# Patient Record
Sex: FEMALE | Race: OTHER | HISPANIC OR LATINO
[De-identification: names, ages, dates, MRNs, and addresses within clinical notes are randomized per-mention and may not be internally consistent; named-entity substitution may affect disease eponyms.]

---

## 2018-01-10 ENCOUNTER — RESULT REVIEW (OUTPATIENT)
Age: 43
End: 2018-01-10

## 2021-12-03 ENCOUNTER — LABORATORY RESULT (OUTPATIENT)
Age: 46
End: 2021-12-03

## 2021-12-03 ENCOUNTER — APPOINTMENT (OUTPATIENT)
Dept: OBGYN | Facility: CLINIC | Age: 46
End: 2021-12-03
Payer: COMMERCIAL

## 2021-12-03 ENCOUNTER — NON-APPOINTMENT (OUTPATIENT)
Age: 46
End: 2021-12-03

## 2021-12-03 VITALS
DIASTOLIC BLOOD PRESSURE: 60 MMHG | HEIGHT: 59 IN | WEIGHT: 128.2 LBS | SYSTOLIC BLOOD PRESSURE: 112 MMHG | BODY MASS INDEX: 25.84 KG/M2

## 2021-12-03 DIAGNOSIS — Z12.31 ENCOUNTER FOR SCREENING MAMMOGRAM FOR MALIGNANT NEOPLASM OF BREAST: ICD-10-CM

## 2021-12-03 DIAGNOSIS — R10.2 PELVIC AND PERINEAL PAIN: ICD-10-CM

## 2021-12-03 DIAGNOSIS — Z87.19 PERSONAL HISTORY OF OTHER DISEASES OF THE DIGESTIVE SYSTEM: ICD-10-CM

## 2021-12-03 DIAGNOSIS — Z86.2 PERSONAL HISTORY OF DISEASES OF THE BLOOD AND BLOOD-FORMING ORGANS AND CERTAIN DISORDERS INVOLVING THE IMMUNE MECHANISM: ICD-10-CM

## 2021-12-03 DIAGNOSIS — Z78.9 OTHER SPECIFIED HEALTH STATUS: ICD-10-CM

## 2021-12-03 DIAGNOSIS — D64.9 ANEMIA, UNSPECIFIED: ICD-10-CM

## 2021-12-03 DIAGNOSIS — Z01.419 ENCOUNTER FOR GYNECOLOGICAL EXAMINATION (GENERAL) (ROUTINE) W/OUT ABNORMAL FINDINGS: ICD-10-CM

## 2021-12-03 PROBLEM — Z00.00 ENCOUNTER FOR PREVENTIVE HEALTH EXAMINATION: Status: ACTIVE | Noted: 2021-12-03

## 2021-12-03 LAB
BILIRUB UR QL STRIP: NEGATIVE
CLARITY UR: CLEAR
COLLECTION METHOD: NORMAL
GLUCOSE UR-MCNC: NEGATIVE
HCG UR QL: 0.2 EU/DL
HGB UR QL STRIP.AUTO: ABNORMAL
KETONES UR-MCNC: NEGATIVE
LEUKOCYTE ESTERASE UR QL STRIP: ABNORMAL
NITRITE UR QL STRIP: NEGATIVE
PH UR STRIP: 5
PROT UR STRIP-MCNC: NEGATIVE
SP GR UR STRIP: 1.03

## 2021-12-03 PROCEDURE — 81003 URINALYSIS AUTO W/O SCOPE: CPT | Mod: QW

## 2021-12-03 PROCEDURE — 36415 COLL VENOUS BLD VENIPUNCTURE: CPT

## 2021-12-03 PROCEDURE — 99386 PREV VISIT NEW AGE 40-64: CPT

## 2021-12-03 PROCEDURE — 99203 OFFICE O/P NEW LOW 30 MIN: CPT | Mod: 25

## 2021-12-03 NOTE — PROCEDURE
[Cervical Pap Smear] : cervical Pap smear [Liquid Base] : liquid base [Tolerated Well] : the patient tolerated the procedure well [No Complications] : there were no complications [Affirm (Triple Culture)] : Affirm (triple culture)

## 2021-12-04 LAB
BASOPHILS # BLD AUTO: 0.06 K/UL
BASOPHILS NFR BLD AUTO: 0.9 %
EOSINOPHIL # BLD AUTO: 0.08 K/UL
EOSINOPHIL NFR BLD AUTO: 1.2 %
FSH SERPL-MCNC: 15.2 IU/L
HCT VFR BLD CALC: 43.4 %
HGB BLD-MCNC: 13.6 G/DL
HPV HIGH+LOW RISK DNA PNL CVX: DETECTED
IMM GRANULOCYTES NFR BLD AUTO: 0.3 %
LH SERPL-ACNC: 20 IU/L
LYMPHOCYTES # BLD AUTO: 1.72 K/UL
LYMPHOCYTES NFR BLD AUTO: 26.3 %
MAN DIFF?: NORMAL
MCHC RBC-ENTMCNC: 26.6 PG
MCHC RBC-ENTMCNC: 31.3 GM/DL
MCV RBC AUTO: 84.9 FL
MONOCYTES # BLD AUTO: 0.37 K/UL
MONOCYTES NFR BLD AUTO: 5.7 %
NEUTROPHILS # BLD AUTO: 4.29 K/UL
NEUTROPHILS NFR BLD AUTO: 65.6 %
PLATELET # BLD AUTO: 249 K/UL
PROLACTIN SERPL-MCNC: 18.9 NG/ML
RBC # BLD: 5.11 M/UL
RBC # FLD: 19.6 %
TSH SERPL-ACNC: 0.96 UIU/ML
WBC # FLD AUTO: 6.54 K/UL

## 2021-12-06 ENCOUNTER — NON-APPOINTMENT (OUTPATIENT)
Age: 46
End: 2021-12-06

## 2021-12-06 PROBLEM — Z87.19 HISTORY OF GASTROESOPHAGEAL REFLUX (GERD): Status: RESOLVED | Noted: 2021-12-03 | Resolved: 2021-12-06

## 2021-12-06 PROBLEM — Z86.2 HISTORY OF ANEMIA: Status: RESOLVED | Noted: 2021-12-03 | Resolved: 2021-12-06

## 2021-12-06 PROBLEM — Z78.9 NON-SMOKER: Status: ACTIVE | Noted: 2021-12-03

## 2021-12-06 LAB
CANDIDA VAG CYTO: NOT DETECTED
G VAGINALIS+PREV SP MTYP VAG QL MICRO: DETECTED
T VAGINALIS VAG QL WET PREP: NOT DETECTED

## 2021-12-06 NOTE — PHYSICAL EXAM
[Chaperone Present] : A chaperone was present in the examining room during all aspects of the physical examination [Appropriately responsive] : appropriately responsive [Alert] : alert [No Acute Distress] : no acute distress [Soft] : soft [Non-tender] : non-tender [Non-distended] : non-distended [Oriented x3] : oriented x3 [Examination Of The Breasts] : a normal appearance [No Discharge] : no discharge [No Masses] : no breast masses were palpable [Labia Majora] : normal [Labia Minora] : normal [No Bleeding] : There was no active vaginal bleeding [Normal] : normal [Discharge] : a  ~M vaginal discharge was present [Adnexa Tenderness] : tender [Uterine Adnexae] : normal  [FreeTextEntry1] : MOA: Julian Means [FreeTextEntry7] : Benign abdominal exam.  [FreeTextEntry6] : Right adnexal tenderness with TTP on pelvic exam.

## 2021-12-06 NOTE — REVIEW OF SYSTEMS
[Patient Intake Form Reviewed] : Patient intake form was reviewed [Genital Rash/Irritation] : genital rash/irritation

## 2021-12-06 NOTE — HISTORY OF PRESENT ILLNESS
[HPV test offered] : HPV test offered [Y] : Positive pregnancy history [Menarche Age: ____] : age at menarche was [unfilled] [No] : Patient does not have concerns regarding sex [Currently Active] : currently active [Men] : men [Vaginal] : vaginal [TextBox_4] : Ms Coon is a returning new patient and presents for her annual exam [Mammogramdate] : 10/2020 [TextBox_19] : per patient [PapSmeardate] : 1/10/18 [TextBox_31] : neg [HPVDate] : 1/10/18 [TextBox_78] : neg [LMPDate] : 11/23/21 [de-identified] : tubal ligation [PGHxTotal] : 3 [[facility] Hospit[facility]Term] : 3 [[facility] Healthcare Corporationiving] : 3 [FreeTextEntry1] : 11/23/21 [FreeTextEntry3] : tubal ligation

## 2021-12-06 NOTE — END OF VISIT
[FreeTextEntry3] : I, Ashley Red solely acted as scribe for Dr. Ramona Gibson on 12/03/2021. All medical entries made by the Scribe were at my, Dr. Gibson's, direction and personally dictated by me on 12/03/2021. I have reviewed the chart and agree that the record accurately reflects my personal performance of the history, physical exam, assessment, and plan. I have also personally directed, reviewed, and agreed with the chart.

## 2021-12-06 NOTE — DISCUSSION/SUMMARY
[FreeTextEntry1] : -Vaginal discharge with odor- Nitrazine negative on pelvic exam.\par 1) Pap done today.  Affirm collected to r/o vaginitis. \par 2) Rx Metronidazole 0.75% Vaginal Gel sent to pharmacy. Instructions given. \par \par -Right sided pelvic pain and recent vaginal bleeding- Right adnexal tenderness with TTP on pelvic exam.\par 1) Pelvic sono ordered. \par 2) AUB labs ordered. \par 3) We discussed the recommendation for endometrial sampling due to h/o AUB. Options for hysteroscopy with EMB in the office without sedation or D&C with hysteroscopy with anesthesia at [facility] were discussed. She desires to proceed with hysteroscopy with EMB in the office. Procedure details, r/b/a were discussed. \par \par -Foul smelling liquid and associated itching coming from her umbilicus for the last 1 month after cleaning area with a Q-tip\par 1) Benign abdominal exam. \par 2) Rx Lotrisone sent to pharmacy. Instructions given. \par \par -Udip reviewed. UA/Urine culture sent out.\par \par -Prescription for mammogram screening given. Self-breast exam reviewed. \par \par She will follow up for hysteroscopy with EMB and as needed.

## 2021-12-27 LAB
BACTERIA UR CULT: ABNORMAL
CYTOLOGY CVX/VAG DOC THIN PREP: NORMAL

## 2021-12-29 ENCOUNTER — APPOINTMENT (OUTPATIENT)
Dept: OBGYN | Facility: CLINIC | Age: 46
End: 2021-12-29

## 2022-06-22 ENCOUNTER — APPOINTMENT (OUTPATIENT)
Dept: ANTEPARTUM | Facility: CLINIC | Age: 47
End: 2022-06-22
Payer: COMMERCIAL

## 2022-06-22 ENCOUNTER — ASOB RESULT (OUTPATIENT)
Age: 47
End: 2022-06-22

## 2022-06-22 ENCOUNTER — APPOINTMENT (OUTPATIENT)
Dept: OBGYN | Facility: CLINIC | Age: 47
End: 2022-06-22
Payer: COMMERCIAL

## 2022-06-22 VITALS
SYSTOLIC BLOOD PRESSURE: 118 MMHG | WEIGHT: 132 LBS | HEIGHT: 59 IN | DIASTOLIC BLOOD PRESSURE: 80 MMHG | TEMPERATURE: 97 F | BODY MASS INDEX: 26.61 KG/M2

## 2022-06-22 DIAGNOSIS — D25.9 LEIOMYOMA OF UTERUS, UNSPECIFIED: ICD-10-CM

## 2022-06-22 DIAGNOSIS — N89.8 OTHER SPECIFIED NONINFLAMMATORY DISORDERS OF VAGINA: ICD-10-CM

## 2022-06-22 DIAGNOSIS — Z87.42 PERSONAL HISTORY OF OTHER DISEASES OF THE FEMALE GENITAL TRACT: ICD-10-CM

## 2022-06-22 DIAGNOSIS — N92.0 EXCESSIVE AND FREQUENT MENSTRUATION WITH REGULAR CYCLE: ICD-10-CM

## 2022-06-22 DIAGNOSIS — Z87.898 PERSONAL HISTORY OF OTHER SPECIFIED CONDITIONS: ICD-10-CM

## 2022-06-22 DIAGNOSIS — Z12.11 ENCOUNTER FOR SCREENING FOR MALIGNANT NEOPLASM OF COLON: ICD-10-CM

## 2022-06-22 LAB
HCG UR QL: NEGATIVE
QUALITY CONTROL: YES

## 2022-06-22 PROCEDURE — 58558Z: CUSTOM

## 2022-06-22 PROCEDURE — 76830 TRANSVAGINAL US NON-OB: CPT

## 2022-06-22 PROCEDURE — 81025 URINE PREGNANCY TEST: CPT

## 2022-06-22 NOTE — DISCUSSION/SUMMARY
[FreeTextEntry1] : Normal labs reviewed.\par \par No polyps were visualized. \par \par Informed pelvic pain may be caused by intestinal issues. \par \par If insufficient tissue for diagnosis, will repeat sono in 3 months to check lining.\par \par If benign EMB, repeat sono with annual. \par \par Post procedure instruction given. \par \par She will follow up in 2 weeks for results. \par \par All questions and concerns were answered. \par \par

## 2022-06-22 NOTE — END OF VISIT
[FreeTextEntry3] : I, Amalia Bonilla solely acted as a scribe on behalf of  on 06/22/2022. All medical entries made by the scribe were at my, Dr. Gibson, direction and personally dictated by me on 06/22/2022 . I have reviewed the chart and agree that the record accurately reflects my personal performance of the history, physical exam, assessment and plan. I have also personally directed, reviewed and agreed with the chart.\par

## 2022-06-22 NOTE — HISTORY OF PRESENT ILLNESS
[N] : Patient reports normal menses [Tubal Occlusion] : has had a tubal occlusion [Y] : Positive pregnancy history [Menarche Age: ____] : age at menarche was [unfilled] [No] : Patient does not have concerns regarding sex [Currently Active] : currently active [PapSmeardate] : 12/03/21 [TextBox_31] : NEG [HPVDate] : 12/03/21 [TextBox_78] : POSITIVE [LMPDate] : 06/15/22 [PGHxTotal] : 3 [[facility] HospitallTerm] : 3 [[facility] Campusiving] : 3 [FreeTextEntry1] : 06/15/22

## 2022-06-22 NOTE — PHYSICAL EXAM
[Appropriately responsive] : appropriately responsive [Alert] : alert [No Acute Distress] : no acute distress [Oriented x3] : oriented x3 [Chaperone Present] : A chaperone was present in the examining room during all aspects of the physical examination [FreeTextEntry1] : AVANI pina

## 2022-07-19 ENCOUNTER — APPOINTMENT (OUTPATIENT)
Dept: OBGYN | Facility: CLINIC | Age: 47
End: 2022-07-19

## 2022-07-19 VITALS
DIASTOLIC BLOOD PRESSURE: 70 MMHG | BODY MASS INDEX: 26.61 KG/M2 | HEIGHT: 59 IN | SYSTOLIC BLOOD PRESSURE: 110 MMHG | WEIGHT: 132 LBS

## 2022-07-19 DIAGNOSIS — N92.0 EXCESSIVE AND FREQUENT MENSTRUATION WITH REGULAR CYCLE: ICD-10-CM

## 2022-07-19 DIAGNOSIS — D50.1 SIDEROPENIC DYSPHAGIA: ICD-10-CM

## 2022-07-19 LAB — CORE LAB BIOPSY: NORMAL

## 2022-07-19 PROCEDURE — 99213 OFFICE O/P EST LOW 20 MIN: CPT

## 2022-07-22 NOTE — HISTORY OF PRESENT ILLNESS
[N] : Patient does not use contraception [Y] : Positive pregnancy history [Menarche Age: ____] : age at menarche was [unfilled] [Currently Active] : currently active [PapSmeardate] : 12/03/21 [TextBox_31] : WNL [HPVDate] : 12/03/21 [TextBox_78] : DETECTED [LMPDate] : 6/25/22 [PGHxTotal] : 3 [[facility] CenterlTerm] : 3 [[facility]iving] : 3 [FreeTextEntry1] : 6/25/22

## 2022-07-22 NOTE — END OF VISIT
[FreeTextEntry3] : I, Margaret Collins solely acted as scribe for Dr. Ramona Gibson on 07/19/2022 \par All medical entries made by the Scribe were at my, Dr. Gibsno’s, direction and personally\par dictated by me on 07/19/2022 . I have reviewed the chart and agree that the record\par accurately reflects my personal performance of the history, physical exam, assessment and plan. I\par have also personally directed, reviewed, and agreed with the chart.

## 2022-07-22 NOTE — DISCUSSION/SUMMARY
[FreeTextEntry1] : Benign EMB results reviewed with patient. \par \par We discussed her options to manage her uterine bleeding. The Mirena IUD or the Sonia ablation was discussed as possible options for her bleeding. Mirena IUD pamphlet as well as a Sonia ablation was given. \par \par F/u for pelvic sono or as needed.

## 2022-09-19 ENCOUNTER — APPOINTMENT (OUTPATIENT)
Dept: ANTEPARTUM | Facility: CLINIC | Age: 47
End: 2022-09-19

## 2022-09-19 ENCOUNTER — ASOB RESULT (OUTPATIENT)
Age: 47
End: 2022-09-19

## 2022-09-19 ENCOUNTER — APPOINTMENT (OUTPATIENT)
Dept: OBGYN | Facility: CLINIC | Age: 47
End: 2022-09-19

## 2022-09-19 VITALS
TEMPERATURE: 97 F | BODY MASS INDEX: 26.61 KG/M2 | DIASTOLIC BLOOD PRESSURE: 74 MMHG | HEIGHT: 59 IN | WEIGHT: 132 LBS | SYSTOLIC BLOOD PRESSURE: 116 MMHG

## 2022-09-19 DIAGNOSIS — N83.202 UNSPECIFIED OVARIAN CYST, LEFT SIDE: ICD-10-CM

## 2022-09-19 DIAGNOSIS — Z87.440 PERSONAL HISTORY OF URINARY (TRACT) INFECTIONS: ICD-10-CM

## 2022-09-19 PROCEDURE — 99212 OFFICE O/P EST SF 10 MIN: CPT

## 2022-09-19 PROCEDURE — 76830 TRANSVAGINAL US NON-OB: CPT

## 2022-09-21 PROBLEM — Z87.440 HISTORY OF CYSTITIS: Status: RESOLVED | Noted: 2021-12-06 | Resolved: 2022-09-21

## 2022-09-21 NOTE — HISTORY OF PRESENT ILLNESS
[N] : Patient does not use contraception [Y] : Positive pregnancy history [Menarche Age: ____] : age at menarche was [unfilled] [No] : Patient does not have concerns regarding sex [Currently Active] : currently active [PapSmeardate] : 12/03/21 [TextBox_31] : NEG [HPVDate] : 12/03/21 [TextBox_78] : POSITIVE [LMPDate] : 07/25/22 [PGHxTotal] : 3 [[facility]ex[facility] HospitallTerm] : 3 [[facility] Hospitaliving] : 3 [FreeTextEntry1] : 07/25/22

## 2022-09-21 NOTE — END OF VISIT
[FreeTextEntry3] : I, Margaret Collins solely acted as scribe for Dr. Ramona Gibson on 09/19/2022 \par All medical entries made by the Scribe were at my, Dr. Gibson’s, direction and personally\par dictated by me on 09/19/2022 . I have reviewed the chart and agree that the record\par accurately reflects my personal performance of the history, physical exam, assessment and plan. I\par have also personally directed, reviewed, and agreed with the chart.

## 2022-09-21 NOTE — DISCUSSION/SUMMARY
[FreeTextEntry1] : Previous sono results reviewed in conjunction with today's sono. Bilateral small simple cysts. Repeat imaging not needed. \par \par F/u annually or as needed.

## 2022-12-20 ENCOUNTER — APPOINTMENT (OUTPATIENT)
Dept: OBGYN | Facility: CLINIC | Age: 47
End: 2022-12-20

## 2022-12-20 VITALS
SYSTOLIC BLOOD PRESSURE: 109 MMHG | HEIGHT: 59 IN | BODY MASS INDEX: 26.81 KG/M2 | WEIGHT: 133 LBS | DIASTOLIC BLOOD PRESSURE: 71 MMHG

## 2022-12-20 DIAGNOSIS — N64.4 MASTODYNIA: ICD-10-CM

## 2022-12-20 DIAGNOSIS — Z12.31 ENCOUNTER FOR SCREENING MAMMOGRAM FOR MALIGNANT NEOPLASM OF BREAST: ICD-10-CM

## 2022-12-20 PROCEDURE — 99396 PREV VISIT EST AGE 40-64: CPT

## 2022-12-20 RX ORDER — METRONIDAZOLE 7.5 MG/G
0.75 GEL VAGINAL
Qty: 1 | Refills: 3 | Status: DISCONTINUED | COMMUNITY
Start: 2021-12-03 | End: 2022-12-20

## 2022-12-20 RX ORDER — NITROFURANTOIN (MONOHYDRATE/MACROCRYSTALS) 25; 75 MG/1; MG/1
100 CAPSULE ORAL
Qty: 10 | Refills: 1 | Status: DISCONTINUED | COMMUNITY
Start: 2021-12-06 | End: 2022-12-20

## 2022-12-20 RX ORDER — MULTIVIT-MIN/IRON/FOLIC ACID/K 18-600-40
CAPSULE ORAL
Refills: 0 | Status: DISCONTINUED | COMMUNITY
End: 2022-12-20

## 2022-12-20 RX ORDER — CLOTRIMAZOLE AND BETAMETHASONE DIPROPIONATE 10; .5 MG/G; MG/G
1-0.05 CREAM TOPICAL TWICE DAILY
Qty: 45 | Refills: 1 | Status: DISCONTINUED | COMMUNITY
Start: 2021-12-03 | End: 2022-12-20

## 2022-12-20 RX ORDER — IRON/IRON ASP GLY/FA/MV-MIN 38 125-25-1MG
TABLET ORAL
Refills: 0 | Status: DISCONTINUED | COMMUNITY
End: 2022-12-20

## 2022-12-20 NOTE — DISCUSSION/SUMMARY
[FreeTextEntry1] : Pap completed today, will f/u results\par \par RX for screening mammogram provided, rx for breast US due to breast pain, we discussed breast pain is likely hormonal as it occurs in association with her menses. We reviewed the definition of perimenopause. \par \par Encouraged self breast exams.\par \par FU annually or sooner as needed, all questions addressed.

## 2022-12-20 NOTE — HISTORY OF PRESENT ILLNESS
[Menarche Age ____] : age at menarche was [unfilled] [Definite ___ (Date)] : the last menstrual period was [unfilled] [Irregular Cycle Intervals] : are  irregular [Y] : Positive pregnancy history [Menarche Age: ____] : age at menarche was [unfilled] [Currently Active] : currently active [PapSmeardate] : 12/03/2021 [TextBox_31] : WNL [HPVDate] : 12/03/2021 [TextBox_78] : DETECTED [LMPDate] : 12/05/2022 [PGHxTotal] : 3 [[facility] MassachusettslTerm] : 3 [[facility] Behavior[facility] Health Servicesiving] : 3 [FreeTextEntry1] : 12/05/2022

## 2022-12-20 NOTE — PHYSICAL EXAM
[Chaperone Present] : A chaperone was present in the examining room during all aspects of the physical examination [FreeTextEntry1] : Breanna TOLEDO  [Appropriately responsive] : appropriately responsive [Alert] : alert [No Acute Distress] : no acute distress [Oriented x3] : oriented x3 [Examination Of The Breasts] : a normal appearance [No Masses] : no breast masses were palpable [Labia Majora] : normal [Labia Minora] : normal [Normal] : normal [Uterine Adnexae] : normal

## 2022-12-22 LAB — HPV HIGH+LOW RISK DNA PNL CVX: NOT DETECTED

## 2023-01-04 DIAGNOSIS — B96.89 ACUTE VAGINITIS: ICD-10-CM

## 2023-01-04 DIAGNOSIS — N76.0 ACUTE VAGINITIS: ICD-10-CM

## 2023-01-04 LAB — CYTOLOGY CVX/VAG DOC THIN PREP: ABNORMAL

## 2023-01-04 RX ORDER — METRONIDAZOLE 500 MG/1
500 TABLET ORAL TWICE DAILY
Qty: 14 | Refills: 0 | Status: ACTIVE | COMMUNITY
Start: 2023-01-04 | End: 1900-01-01

## 2024-01-08 ENCOUNTER — APPOINTMENT (OUTPATIENT)
Dept: OBGYN | Facility: CLINIC | Age: 49
End: 2024-01-08
Payer: COMMERCIAL

## 2024-01-08 VITALS
WEIGHT: 134 LBS | SYSTOLIC BLOOD PRESSURE: 120 MMHG | DIASTOLIC BLOOD PRESSURE: 70 MMHG | HEIGHT: 59 IN | BODY MASS INDEX: 27.01 KG/M2

## 2024-01-08 DIAGNOSIS — Z12.4 ENCOUNTER FOR SCREENING FOR MALIGNANT NEOPLASM OF CERVIX: ICD-10-CM

## 2024-01-08 DIAGNOSIS — N95.1 MENOPAUSAL AND FEMALE CLIMACTERIC STATES: ICD-10-CM

## 2024-01-08 DIAGNOSIS — N84.1 POLYP OF CERVIX UTERI: ICD-10-CM

## 2024-01-08 DIAGNOSIS — R23.2 FLUSHING: ICD-10-CM

## 2024-01-08 DIAGNOSIS — Z11.51 ENCOUNTER FOR SCREENING FOR HUMAN PAPILLOMAVIRUS (HPV): ICD-10-CM

## 2024-01-08 DIAGNOSIS — Z12.39 ENCOUNTER FOR OTHER SCREENING FOR MALIGNANT NEOPLASM OF BREAST: ICD-10-CM

## 2024-01-08 DIAGNOSIS — Z01.419 ENCOUNTER FOR GYNECOLOGICAL EXAMINATION (GENERAL) (ROUTINE) W/OUT ABNORMAL FINDINGS: ICD-10-CM

## 2024-01-08 PROCEDURE — 57500 BIOPSY OF CERVIX: CPT

## 2024-01-08 PROCEDURE — 99396 PREV VISIT EST AGE 40-64: CPT | Mod: 25

## 2024-01-08 PROCEDURE — 81025 URINE PREGNANCY TEST: CPT

## 2024-01-09 LAB
HCG UR QL: NEGATIVE
HPV HIGH+LOW RISK DNA PNL CVX: NOT DETECTED
QUALITY CONTROL: YES

## 2024-01-15 PROBLEM — R23.2 HOT FLASHES: Status: ACTIVE | Noted: 2024-01-15

## 2024-01-15 PROBLEM — N95.1 PERIMENOPAUSE: Status: ACTIVE | Noted: 2024-01-15

## 2024-01-15 NOTE — PHYSICAL EXAM
[Chaperone Present] : A chaperone was present in the examining room during all aspects of the physical examination [FreeTextEntry1] : AVANI [Appropriately responsive] : appropriately responsive [Alert] : alert [No Acute Distress] : no acute distress [No Lymphadenopathy] : no lymphadenopathy [Soft] : soft [Non-tender] : non-tender [Non-distended] : non-distended [Oriented x3] : oriented x3 [FreeTextEntry3] : mobile thyroid, no masses, no nodules [FreeTextEntry6] : No cervical or axillary lymphadenopathy. [Examination Of The Breasts] : a normal appearance [No Masses] : no breast masses were palpable [Labia Majora] : normal [Labia Minora] : normal [Polyp ___ cm] : [unfilled] ~[facility] polyp [Normal] : normal [Uterine Adnexae] : normal

## 2024-01-15 NOTE — HISTORY OF PRESENT ILLNESS
[HPV test offered] : HPV test offered [Menarche Age: ____] : age at menarche was [unfilled] [No] : Patient does not have concerns regarding sex [Y] : Patient is sexually active [Currently Active] : currently active [Mammogramdate] : 1/26/23 [TextBox_19] : BR1 [BreastSonogramDate] : 1/26/23 [TextBox_25] : BR1 [PapSmeardate] : 12/20/22 [TextBox_31] : NEG [ColonoscopyDate] : NEVER [TextBox_43] : PER PT [HPVDate] : 12/20/22 [TextBox_78] : NEG [LMPDate] : 10/15/23 [PGHxTotal] : 3 [[facility] Servicesx[facility] Behavior[facility] Health HospitallTerm] : 3 [[facility] Health Centeriving] : 3 [FreeTextEntry1] : 10/15/23

## 2024-01-19 ENCOUNTER — NON-APPOINTMENT (OUTPATIENT)
Age: 49
End: 2024-01-19

## 2024-01-21 LAB
CORE LAB BIOPSY: NORMAL
CYTOLOGY CVX/VAG DOC THIN PREP: ABNORMAL

## 2024-01-22 ENCOUNTER — NON-APPOINTMENT (OUTPATIENT)
Age: 49
End: 2024-01-22

## 2025-01-13 ENCOUNTER — APPOINTMENT (OUTPATIENT)
Dept: OBGYN | Facility: CLINIC | Age: 50
End: 2025-01-13

## 2025-04-21 DIAGNOSIS — R92.323 MAMMOGRAPHIC FIBROGLANDULAR DENSITY, BILATERAL BREASTS: ICD-10-CM

## 2025-04-21 DIAGNOSIS — R92.8 OTHER ABNORMAL AND INCONCLUSIVE FINDINGS ON DIAGNOSTIC IMAGING OF BREAST: ICD-10-CM
